# Patient Record
Sex: MALE | Race: WHITE | NOT HISPANIC OR LATINO | Employment: FULL TIME | URBAN - METROPOLITAN AREA
[De-identification: names, ages, dates, MRNs, and addresses within clinical notes are randomized per-mention and may not be internally consistent; named-entity substitution may affect disease eponyms.]

---

## 2020-03-11 ENCOUNTER — OFFICE VISIT (OUTPATIENT)
Dept: URGENT CARE | Facility: CLINIC | Age: 35
End: 2020-03-11
Payer: COMMERCIAL

## 2020-03-11 ENCOUNTER — APPOINTMENT (OUTPATIENT)
Dept: RADIOLOGY | Facility: CLINIC | Age: 35
End: 2020-03-11
Payer: COMMERCIAL

## 2020-03-11 VITALS
SYSTOLIC BLOOD PRESSURE: 150 MMHG | WEIGHT: 213 LBS | HEIGHT: 70 IN | HEART RATE: 70 BPM | DIASTOLIC BLOOD PRESSURE: 90 MMHG | BODY MASS INDEX: 30.49 KG/M2 | TEMPERATURE: 98.7 F | RESPIRATION RATE: 18 BRPM | OXYGEN SATURATION: 97 %

## 2020-03-11 DIAGNOSIS — M25.511 ACUTE PAIN OF RIGHT SHOULDER: Primary | ICD-10-CM

## 2020-03-11 DIAGNOSIS — S49.91XA INJURY OF RIGHT SHOULDER, INITIAL ENCOUNTER: ICD-10-CM

## 2020-03-11 PROCEDURE — G0382 LEV 3 HOSP TYPE B ED VISIT: HCPCS | Performed by: PHYSICIAN ASSISTANT

## 2020-03-11 PROCEDURE — 73030 X-RAY EXAM OF SHOULDER: CPT

## 2020-03-11 RX ORDER — CYCLOBENZAPRINE HCL 10 MG
10 TABLET ORAL
Qty: 21 TABLET | Refills: 0 | Status: SHIPPED | OUTPATIENT
Start: 2020-03-11

## 2020-03-11 RX ORDER — METHYLPREDNISOLONE 4 MG/1
TABLET ORAL
Qty: 1 EACH | Refills: 0 | Status: SHIPPED | OUTPATIENT
Start: 2020-03-11

## 2020-03-11 NOTE — PATIENT INSTRUCTIONS
Will follow-up with final radiology report when available, within 24 hours  Ice 20 minutes every 3-4 hours, may alternate with moist heat  Steroids as directed, make sure you eat when taking the steroid  Muscle relaxer would recommend at that time, do not drive while taking

## 2020-03-11 NOTE — PROGRESS NOTES
[id number]        NAME: Kyra Enriquez is a 38 y o  male  : 1985    MRN: [id number]  DATE: 2020  TIME: 2:12 PM    Assessment and Plan   Acute pain of right shoulder [M25 511]  1  Acute pain of right shoulder  XR shoulder 2+ vw right    cyclobenzaprine (FLEXERIL) 10 mg tablet    methylPREDNISolone 4 MG tablet therapy pack     Right shoulder xray reviewed by myself there are calcifications consistent with a calcific tendinitis     Patient Instructions     Follow up with PCP in 3-5 days  Proceed to  ER if symptoms worsen  Chief Complaint     Chief Complaint   Patient presents with    Shoulder Pain     c/o of right shoulder pain for three days  History of Present Illness       69-year-old male presents for evaluation of right shoulder pain  Symptoms ongoing for about 3 days  Patient states he believes he slept on it wrong  Patient complains of pain radiating from the right side of the neck down to the right shoulder  He denies previous injury  Denies paresthesias  Review of Systems   Review of Systems   Constitutional: Negative for chills, fatigue and fever  HENT: Negative for congestion, ear pain, sinus pain, sore throat and trouble swallowing  Eyes: Negative for pain, discharge and redness  Respiratory: Negative for cough, chest tightness, shortness of breath and wheezing  Cardiovascular: Negative for chest pain, palpitations and leg swelling  Gastrointestinal: Negative for abdominal pain, diarrhea, nausea and vomiting  Musculoskeletal: Positive for arthralgias  Negative for joint swelling and myalgias  Skin: Negative for rash  Neurological: Negative for dizziness, weakness, numbness and headaches           Current Medications       Current Outpatient Medications:     cyclobenzaprine (FLEXERIL) 10 mg tablet, Take 1 tablet (10 mg total) by mouth daily at bedtime as needed for muscle spasms, Disp: 21 tablet, Rfl: 0    methylPREDNISolone 4 MG tablet therapy pack, Use as directed on package, Disp: 1 each, Rfl: 0    Current Allergies     Allergies as of 03/11/2020    (No Known Allergies)            The following portions of the patient's history were reviewed and updated as appropriate: allergies, current medications, past family history, past medical history, past social history, past surgical history and problem list      History reviewed  No pertinent past medical history  History reviewed  No pertinent surgical history  Family History   Problem Relation Age of Onset    No Known Problems Mother     No Known Problems Father          Medications have been verified  Objective   /90   Pulse 70   Temp 98 7 °F (37 1 °C) (Temporal)   Resp 18   Ht 5' 10" (1 778 m)   Wt 96 6 kg (213 lb)   SpO2 97%   BMI 30 56 kg/m²        Physical Exam     Physical Exam   Constitutional: Vital signs are normal  He appears well-developed  No distress  Cardiovascular: Normal rate, regular rhythm, normal heart sounds and intact distal pulses  Pulmonary/Chest: Effort normal and breath sounds normal    Musculoskeletal:        Right shoulder: He exhibits tenderness (ttp AC joint)          Left shoulder: Normal    Limited R shoulder flexion to 90 degrees

## 2020-03-12 ENCOUNTER — TELEPHONE (OUTPATIENT)
Dept: URGENT CARE | Facility: CLINIC | Age: 35
End: 2020-03-12

## 2020-03-12 NOTE — TELEPHONE ENCOUNTER
Discussed final radiology report with patient  There is a calcific tendinitis noted on right shoulder xray that was discussed with the patient yesterday  Patient states he has a f/u with orthopedics on Monday  Encourage follow up and ice 20 min every 3-4 hours  Patient has no further questions at this time

## 2021-09-20 ENCOUNTER — OFFICE VISIT (OUTPATIENT)
Dept: URGENT CARE | Facility: CLINIC | Age: 36
End: 2021-09-20
Payer: COMMERCIAL

## 2021-09-20 VITALS
DIASTOLIC BLOOD PRESSURE: 90 MMHG | HEART RATE: 65 BPM | BODY MASS INDEX: 31.14 KG/M2 | RESPIRATION RATE: 16 BRPM | OXYGEN SATURATION: 96 % | TEMPERATURE: 97.7 F | SYSTOLIC BLOOD PRESSURE: 138 MMHG | WEIGHT: 217 LBS

## 2021-09-20 DIAGNOSIS — R21 RASH: Primary | ICD-10-CM

## 2021-09-20 PROCEDURE — G0382 LEV 3 HOSP TYPE B ED VISIT: HCPCS | Performed by: PHYSICIAN ASSISTANT

## 2021-09-20 RX ORDER — LISINOPRIL 20 MG/1
TABLET ORAL
COMMUNITY
Start: 2021-06-16

## 2021-09-20 RX ORDER — LOSARTAN POTASSIUM 50 MG/1
50 TABLET ORAL DAILY
COMMUNITY
Start: 2021-07-01

## 2021-09-20 RX ORDER — CLINDAMYCIN HYDROCHLORIDE 300 MG/1
300 CAPSULE ORAL 3 TIMES DAILY
Qty: 21 CAPSULE | Refills: 0 | Status: SHIPPED | OUTPATIENT
Start: 2021-09-20 | End: 2021-09-27

## 2021-09-20 NOTE — PATIENT INSTRUCTIONS
Folliculitis   WHAT YOU NEED TO KNOW:   Folliculitis is inflammation of your hair follicles  A hair follicle is a sac under your skin  Your hair grows out of the follicle  Folliculitis is caused by bacteria or fungus, most commonly a germ called Staph  Folliculitis can occur anywhere you have hair  DISCHARGE INSTRUCTIONS:   Medicines:   · Antibiotics: This medicine is given to fight or prevent an infection caused by bacteria  It may be given as an ointment that you apply to your skin or as a pill  Always take your antibiotics exactly as ordered by your healthcare provider  Never save antibiotics or take leftover antibiotics that were given to you for another illness  · Antifungal medicine: This medicine helps kill fungus that may be causing your folliculitis  It may be given as an cream that you apply to your skin or as a pill  · NSAIDs , such as ibuprofen, help decrease swelling, pain, and fever  This medicine is available with or without a doctor's order  NSAIDs can cause stomach bleeding or kidney problems in certain people  If you take blood thinner medicine, always ask if NSAIDs are safe for you  Always read the medicine label and follow directions  Do not give these medicines to children under 10months of age without direction from your child's healthcare provider  · Antihistamines: This medicine may be given to help decrease itching  · Take your medicine as directed  Contact your healthcare provider if you think your medicine is not helping or if you have side effects  Tell him of her if you are allergic to any medicine  Keep a list of the medicines, vitamins, and herbs you take  Include the amounts, and when and why you take them  Bring the list or the pill bottles to follow-up visits  Carry your medicine list with you in case of an emergency      Follow up with your healthcare provider or dermatologist as directed:  Write down your questions so you remember to ask them during your visits  Manage folliculitis:   · Use warm compresses:  Wet a washcloth with warm water and apply it to the infected skin area to help decrease pain and swelling  Warm compresses may also help drain pus and improve healing  · Clean the area:  Use antibacterial soap to wash the affected area  Change your washcloths and towels every day  · Avoid shaving the area: If possible, do not shave areas that have folliculitis  If you must shave, use an electric razor or new blade every time you shave  Prevent folliculitis:   · Do not share personal items:  Do not share towels, soap, or any personal items with other people  · Do not wear tight clothing:  Do not wear tight-fitting clothes that rub against and irritate your skin  · Treat skin injuries right away:  Treat injuries such as cuts and scrapes right away  Wash them with warm, soapy water, and cover the area to prevent infection  Contact your healthcare provider or dermatologist if:  · You have a fever  · You have foul-smelling pus coming from the bumps on your skin  · Your rash is spreading  · You have questions or concerns about your condition or care  Return to the emergency department if:  · You develop large areas of red, warm, tender skin around the folliculitis  · You develop boils  © Copyright [employer] 2021 Information is for End User's use only and may not be sold, redistributed or otherwise used for commercial purposes  All illustrations and images included in CareNotes® are the copyrighted property of [employer]  or Traci Rowell   The above information is an  only  It is not intended as medical advice for individual conditions or treatments  Talk to your doctor, nurse or pharmacist before following any medical regimen to see if it is safe and effective for you

## 2021-09-20 NOTE — PROGRESS NOTES
[email or web address]        NAME: Aimee South is a 43 y o  male  : 1985    MRN: [id number]  DATE: 2021  TIME: 12:05 PM    Assessment and Plan   Rash [R21]  1  Rash  clindamycin (CLEOCIN) 300 MG capsule         Patient Instructions   Patient Instructions   Folliculitis   WHAT YOU NEED TO KNOW:   Folliculitis is inflammation of your hair follicles  A hair follicle is a sac under your skin  Your hair grows out of the follicle  Folliculitis is caused by bacteria or fungus, most commonly a germ called Staph  Folliculitis can occur anywhere you have hair  DISCHARGE INSTRUCTIONS:   Medicines:   · Antibiotics: This medicine is given to fight or prevent an infection caused by bacteria  It may be given as an ointment that you apply to your skin or as a pill  Always take your antibiotics exactly as ordered by your healthcare provider  Never save antibiotics or take leftover antibiotics that were given to you for another illness  · Antifungal medicine: This medicine helps kill fungus that may be causing your folliculitis  It may be given as an cream that you apply to your skin or as a pill  · NSAIDs , such as ibuprofen, help decrease swelling, pain, and fever  This medicine is available with or without a doctor's order  NSAIDs can cause stomach bleeding or kidney problems in certain people  If you take blood thinner medicine, always ask if NSAIDs are safe for you  Always read the medicine label and follow directions  Do not give these medicines to children under 10months of age without direction from your child's healthcare provider  · Antihistamines: This medicine may be given to help decrease itching  · Take your medicine as directed  Contact your healthcare provider if you think your medicine is not helping or if you have side effects  Tell him of her if you are allergic to any medicine  Keep a list of the medicines, vitamins, and herbs you take   Include the amounts, and when and why you take them  Bring the list or the pill bottles to follow-up visits  Carry your medicine list with you in case of an emergency  Follow up with your healthcare provider or dermatologist as directed:  Write down your questions so you remember to ask them during your visits  Manage folliculitis:   · Use warm compresses:  Wet a washcloth with warm water and apply it to the infected skin area to help decrease pain and swelling  Warm compresses may also help drain pus and improve healing  · Clean the area:  Use antibacterial soap to wash the affected area  Change your washcloths and towels every day  · Avoid shaving the area: If possible, do not shave areas that have folliculitis  If you must shave, use an electric razor or new blade every time you shave  Prevent folliculitis:   · Do not share personal items:  Do not share towels, soap, or any personal items with other people  · Do not wear tight clothing:  Do not wear tight-fitting clothes that rub against and irritate your skin  · Treat skin injuries right away:  Treat injuries such as cuts and scrapes right away  Wash them with warm, soapy water, and cover the area to prevent infection  Contact your healthcare provider or dermatologist if:  · You have a fever  · You have foul-smelling pus coming from the bumps on your skin  · Your rash is spreading  · You have questions or concerns about your condition or care  Return to the emergency department if:  · You develop large areas of red, warm, tender skin around the folliculitis  · You develop boils  © Copyright [address] 2021 Information is for End User's use only and may not be sold, redistributed or otherwise used for commercial purposes  All illustrations and images included in CareNotes® are the copyrighted property of [employer]  or Traci Altman  The above information is an  only   It is not intended as medical advice for individual conditions or treatments  Talk to your doctor, nurse or pharmacist before following any medical regimen to see if it is safe and effective for you  Follow up with PCP in 3-5 days  Proceed to  ER if symptoms worsen  Chief Complaint     Chief Complaint   Patient presents with    Rash     x 2 days, on back, c/o tingling and burning  History of Present Illness       The pt is a 49-year-old male presenting for a rash on his back  He reports tingling and burning  Worried for shingles  Review of Systems   Review of Systems   Constitutional: Negative for activity change, appetite change, chills, diaphoresis and fever  HENT: Negative for congestion, rhinorrhea and sore throat  Respiratory: Negative for cough, chest tightness and shortness of breath  Cardiovascular: Negative for chest pain and palpitations  Gastrointestinal: Negative for abdominal pain, diarrhea, nausea and vomiting  Musculoskeletal: Negative for arthralgias and myalgias  Skin: Positive for rash  Negative for color change and pallor  Neurological: Negative for headaches           Current Medications       Current Outpatient Medications:     losartan (COZAAR) 50 mg tablet, Take 50 mg by mouth daily, Disp: , Rfl:     clindamycin (CLEOCIN) 300 MG capsule, Take 1 capsule (300 mg total) by mouth 3 (three) times a day for 7 days, Disp: 21 capsule, Rfl: 0    cyclobenzaprine (FLEXERIL) 10 mg tablet, Take 1 tablet (10 mg total) by mouth daily at bedtime as needed for muscle spasms (Patient not taking: Reported on 9/20/2021), Disp: 21 tablet, Rfl: 0    lisinopril (ZESTRIL) 20 mg tablet, , Disp: , Rfl:     methylPREDNISolone 4 MG tablet therapy pack, Use as directed on package (Patient not taking: Reported on 9/20/2021), Disp: 1 each, Rfl: 0    Current Allergies     Allergies as of 09/20/2021    (No Known Allergies)            The following portions of the patient's history were reviewed and updated as appropriate: allergies, current medications, past family history, past medical history, past social history, past surgical history and problem list      Past Medical History:   Diagnosis Date    Hypertension        History reviewed  No pertinent surgical history  Family History   Problem Relation Age of Onset    No Known Problems Mother     No Known Problems Father          Medications have been verified  Objective   /90   Pulse 65   Temp 97 7 °F (36 5 °C) (Temporal)   Resp 16   Wt 98 4 kg (217 lb)   SpO2 96%   BMI 31 14 kg/m²        Physical Exam     Physical Exam  Vitals reviewed  Constitutional:       General: He is not in acute distress  Appearance: Normal appearance  He is normal weight  He is not ill-appearing, toxic-appearing or diaphoretic  HENT:      Head: Normocephalic and atraumatic  Skin:     General: Skin is warm and dry  Capillary Refill: Capillary refill takes less than 2 seconds  Findings: Rash present  Comments: Folliculitis over the back diffusely  Small pustules in the hair follicles       Neurological:      Mental Status: He is alert

## 2021-11-04 ENCOUNTER — HOSPITAL ENCOUNTER (EMERGENCY)
Facility: HOSPITAL | Age: 36
Discharge: HOME | End: 2021-11-04
Attending: EMERGENCY MEDICINE
Payer: COMMERCIAL

## 2021-11-04 VITALS
OXYGEN SATURATION: 98 % | HEART RATE: 60 BPM | TEMPERATURE: 97.7 F | BODY MASS INDEX: 31.14 KG/M2 | SYSTOLIC BLOOD PRESSURE: 170 MMHG | WEIGHT: 217 LBS | DIASTOLIC BLOOD PRESSURE: 108 MMHG | RESPIRATION RATE: 16 BRPM

## 2021-11-04 DIAGNOSIS — R03.0 ELEVATED BLOOD PRESSURE READING: Primary | ICD-10-CM

## 2021-11-04 DIAGNOSIS — R51.9 ACUTE NONINTRACTABLE HEADACHE, UNSPECIFIED HEADACHE TYPE: ICD-10-CM

## 2021-11-04 LAB
ALBUMIN SERPL BCP-MCNC: 4 G/DL (ref 3.5–5)
ALP SERPL-CCNC: 49 U/L (ref 46–116)
ALT SERPL W P-5'-P-CCNC: 36 U/L (ref 12–78)
ANION GAP SERPL CALCULATED.3IONS-SCNC: 7 MMOL/L (ref 4–13)
AST SERPL W P-5'-P-CCNC: 18 U/L (ref 5–45)
BASOPHILS # BLD AUTO: 0.09 THOUSANDS/ΜL (ref 0–0.1)
BASOPHILS NFR BLD AUTO: 1 % (ref 0–1)
BILIRUB SERPL-MCNC: 0.36 MG/DL (ref 0.2–1)
BUN SERPL-MCNC: 18 MG/DL (ref 5–25)
CALCIUM SERPL-MCNC: 8.8 MG/DL (ref 8.3–10.1)
CHLORIDE SERPL-SCNC: 105 MMOL/L (ref 100–108)
CO2 SERPL-SCNC: 30 MMOL/L (ref 21–32)
CREAT SERPL-MCNC: 0.95 MG/DL (ref 0.6–1.3)
EOSINOPHIL # BLD AUTO: 0.23 THOUSAND/ΜL (ref 0–0.61)
EOSINOPHIL NFR BLD AUTO: 2 % (ref 0–6)
ERYTHROCYTE [DISTWIDTH] IN BLOOD BY AUTOMATED COUNT: 12.2 % (ref 11.6–15.1)
GFR SERPL CREATININE-BSD FRML MDRD: 101 ML/MIN/1.73SQ M
GLUCOSE SERPL-MCNC: 110 MG/DL (ref 65–140)
HCT VFR BLD AUTO: 46.4 % (ref 36.5–49.3)
HGB BLD-MCNC: 15.6 G/DL (ref 12–17)
IMM GRANULOCYTES # BLD AUTO: 0.07 THOUSAND/UL (ref 0–0.2)
IMM GRANULOCYTES NFR BLD AUTO: 1 % (ref 0–2)
LYMPHOCYTES # BLD AUTO: 1.75 THOUSANDS/ΜL (ref 0.6–4.47)
LYMPHOCYTES NFR BLD AUTO: 13 % (ref 14–44)
MCH RBC QN AUTO: 29 PG (ref 26.8–34.3)
MCHC RBC AUTO-ENTMCNC: 33.6 G/DL (ref 31.4–37.4)
MCV RBC AUTO: 86 FL (ref 82–98)
MONOCYTES # BLD AUTO: 0.76 THOUSAND/ΜL (ref 0.17–1.22)
MONOCYTES NFR BLD AUTO: 6 % (ref 4–12)
NEUTROPHILS # BLD AUTO: 10.67 THOUSANDS/ΜL (ref 1.85–7.62)
NEUTS SEG NFR BLD AUTO: 77 % (ref 43–75)
NRBC BLD AUTO-RTO: 0 /100 WBCS
PLATELET # BLD AUTO: 290 THOUSANDS/UL (ref 149–390)
PMV BLD AUTO: 9.9 FL (ref 8.9–12.7)
POTASSIUM SERPL-SCNC: 3.9 MMOL/L (ref 3.5–5.3)
PROT SERPL-MCNC: 7.7 G/DL (ref 6.4–8.2)
RBC # BLD AUTO: 5.38 MILLION/UL (ref 3.88–5.62)
SODIUM SERPL-SCNC: 142 MMOL/L (ref 136–145)
TSH SERPL DL<=0.05 MIU/L-ACNC: 0.38 UIU/ML (ref 0.36–3.74)
WBC # BLD AUTO: 13.57 THOUSAND/UL (ref 4.31–10.16)

## 2021-11-04 PROCEDURE — 36415 COLL VENOUS BLD VENIPUNCTURE: CPT | Performed by: PHYSICIAN ASSISTANT

## 2021-11-04 PROCEDURE — 93005 ELECTROCARDIOGRAM TRACING: CPT

## 2021-11-04 PROCEDURE — 96375 TX/PRO/DX INJ NEW DRUG ADDON: CPT

## 2021-11-04 PROCEDURE — 99285 EMERGENCY DEPT VISIT HI MDM: CPT | Performed by: PHYSICIAN ASSISTANT

## 2021-11-04 PROCEDURE — 84443 ASSAY THYROID STIM HORMONE: CPT | Performed by: PHYSICIAN ASSISTANT

## 2021-11-04 PROCEDURE — 99284 EMERGENCY DEPT VISIT MOD MDM: CPT

## 2021-11-04 PROCEDURE — 96374 THER/PROPH/DIAG INJ IV PUSH: CPT

## 2021-11-04 PROCEDURE — 85025 COMPLETE CBC W/AUTO DIFF WBC: CPT | Performed by: PHYSICIAN ASSISTANT

## 2021-11-04 PROCEDURE — 80053 COMPREHEN METABOLIC PANEL: CPT | Performed by: PHYSICIAN ASSISTANT

## 2021-11-04 RX ORDER — KETOROLAC TROMETHAMINE 30 MG/ML
15 INJECTION, SOLUTION INTRAMUSCULAR; INTRAVENOUS ONCE
Status: COMPLETED | OUTPATIENT
Start: 2021-11-04 | End: 2021-11-04

## 2021-11-04 RX ORDER — DIPHENHYDRAMINE HYDROCHLORIDE 50 MG/ML
25 INJECTION INTRAMUSCULAR; INTRAVENOUS ONCE
Status: COMPLETED | OUTPATIENT
Start: 2021-11-04 | End: 2021-11-04

## 2021-11-04 RX ORDER — METOCLOPRAMIDE HYDROCHLORIDE 5 MG/ML
10 INJECTION INTRAMUSCULAR; INTRAVENOUS ONCE
Status: COMPLETED | OUTPATIENT
Start: 2021-11-04 | End: 2021-11-04

## 2021-11-04 RX ADMIN — DIPHENHYDRAMINE HYDROCHLORIDE 25 MG: 50 INJECTION, SOLUTION INTRAMUSCULAR; INTRAVENOUS at 21:36

## 2021-11-04 RX ADMIN — METOCLOPRAMIDE 10 MG: 5 INJECTION, SOLUTION INTRAMUSCULAR; INTRAVENOUS at 21:36

## 2021-11-04 RX ADMIN — KETOROLAC TROMETHAMINE 15 MG: 30 INJECTION, SOLUTION INTRAMUSCULAR at 21:36

## 2021-11-05 LAB
ATRIAL RATE: 57 BPM
P AXIS: 53 DEGREES
PR INTERVAL: 196 MS
QRS AXIS: 54 DEGREES
QRSD INTERVAL: 90 MS
QT INTERVAL: 402 MS
QTC INTERVAL: 391 MS
T WAVE AXIS: 44 DEGREES
VENTRICULAR RATE: 57 BPM

## 2021-11-05 PROCEDURE — 93010 ELECTROCARDIOGRAM REPORT: CPT | Performed by: INTERNAL MEDICINE

## 2023-03-05 ENCOUNTER — APPOINTMENT (EMERGENCY)
Dept: RADIOLOGY | Facility: HOSPITAL | Age: 38
End: 2023-03-05

## 2023-03-05 ENCOUNTER — HOSPITAL ENCOUNTER (EMERGENCY)
Facility: HOSPITAL | Age: 38
Discharge: HOME | End: 2023-03-05
Attending: EMERGENCY MEDICINE | Admitting: EMERGENCY MEDICINE

## 2023-03-05 VITALS
RESPIRATION RATE: 18 BRPM | TEMPERATURE: 98 F | DIASTOLIC BLOOD PRESSURE: 57 MMHG | HEART RATE: 71 BPM | SYSTOLIC BLOOD PRESSURE: 114 MMHG | OXYGEN SATURATION: 98 %

## 2023-03-05 DIAGNOSIS — S69.91XA RIGHT WRIST INJURY, INITIAL ENCOUNTER: Primary | ICD-10-CM

## 2023-03-05 RX ORDER — OXYCODONE HYDROCHLORIDE 5 MG/1
5 TABLET ORAL EVERY 4 HOURS PRN
Qty: 8 TABLET | Refills: 0 | Status: SHIPPED | OUTPATIENT
Start: 2023-03-05 | End: 2023-03-15

## 2023-03-05 RX ORDER — IBUPROFEN 600 MG/1
600 TABLET ORAL ONCE
Status: COMPLETED | OUTPATIENT
Start: 2023-03-05 | End: 2023-03-05

## 2023-03-05 RX ORDER — NAPROXEN 500 MG/1
500 TABLET ORAL EVERY 12 HOURS PRN
Qty: 20 TABLET | Refills: 0 | Status: SHIPPED | OUTPATIENT
Start: 2023-03-05

## 2023-03-05 RX ORDER — ACETAMINOPHEN 325 MG/1
650 TABLET ORAL ONCE
Status: COMPLETED | OUTPATIENT
Start: 2023-03-05 | End: 2023-03-05

## 2023-03-05 RX ADMIN — ACETAMINOPHEN 650 MG: 325 TABLET, FILM COATED ORAL at 19:16

## 2023-03-05 RX ADMIN — IBUPROFEN 600 MG: 600 TABLET, FILM COATED ORAL at 19:16

## 2023-03-05 NOTE — Clinical Note
Sallie Menon was seen and treated in our emergency department on 3/5/2023  No work until cleared by Family Doctor/Orthopedics        Diagnosis: Right wrist injury    Shimon    He may return on this date: If you have any questions or concerns, please don't hesitate to call        Phil Officer, DO    ______________________________           _______________          _______________  Hospital Representative                              Date                                Time

## 2023-03-06 VITALS
WEIGHT: 203.4 LBS | SYSTOLIC BLOOD PRESSURE: 128 MMHG | RESPIRATION RATE: 18 BRPM | DIASTOLIC BLOOD PRESSURE: 80 MMHG | BODY MASS INDEX: 29.12 KG/M2 | HEIGHT: 70 IN | HEART RATE: 79 BPM | OXYGEN SATURATION: 98 %

## 2023-03-06 DIAGNOSIS — S69.91XA RIGHT WRIST INJURY, INITIAL ENCOUNTER: Primary | ICD-10-CM

## 2023-03-06 NOTE — RESULT ENCOUNTER NOTE
Justine with patient has an appointment with orthopedics in about 45 minutes to go over x-ray    States that his wrist feels better but is aware that it was recommend that he has a CAT scan and is following up today

## 2023-03-06 NOTE — PROGRESS NOTES
Assessment/Plan:    No problem-specific Assessment & Plan notes found for this encounter  Diagnoses and all orders for this visit:    Right wrist injury, initial encounter  -     Ambulatory Referral to Orthopedic Surgery  -     Cock Up Wrist Splint          Prior radiographs emergency room reviewed independently with patient  There are ossific densities noted on the radiograph however no acute fractures were appreciated  Given patient's resolution of pain symptoms and no reproduction of pain with palpation and pain-free range of motion, I advised patient that we can hold on advanced imaging at this time  Patient was taken out of the thumb spica splint and transitioned into a wrist brace for activity use  I am recommending that patient can follow-up as needed if pain symptoms recur  Patient expressed understanding and will follow-up if needed    Subjective:      Patient ID: Shan Colindres is a 34 y o  male presenting for initial evaluation in regards to right wrist injury  Patient states that approximately 1 week ago he was climbing down a ladder and struck his right wrist against the ladder  States that he did not feel pain initially however in the coming days he started developing increased pain over the volar aspect of the right wrist radial sided  He was seen in the emergency room and placed into a thumb spica splint  He states that he has been compliant with the splint since then  Reports complete resolution of pain symptoms since yesterday  Does report occasional discoloration in the fingertips in cold weather  Radiographs of the right wrist were performed and ossific densities were noted at the volar aspect of the wrist which are considered possible fracture fragments      HPI    The following portions of the patient's history were reviewed and updated as appropriate: allergies, current medications, past family history, past medical history, past social history, past surgical history and problem list     Review of Systems      Objective:      /80   Pulse 79   Resp 18   Ht 5' 10" (1 778 m)   Wt 92 3 kg (203 lb 6 4 oz)   SpO2 98%   BMI 29 18 kg/m²          Physical Exam      Right wrist and hand exam  No ecchymosis or gross deformity noted  No skin changes  No swelling  Patient exhibits full range of motion with wrist flexion extension and forearm pronation supination with 5 out of 5 strength  He does not have any tenderness to palpation over the anatomic snuffbox  No tenderness to palpation over the distal radius or ulna  No sensory deficits to light touch  Normal cap refill

## 2023-03-06 NOTE — ED PROVIDER NOTES
History  Chief Complaint   Patient presents with   • Wrist Injury     Patient reports hitting right wrist on ladder x 1 week ago  Patient is a 57-year-old male with past medical history of hypertension, presents to the emergency department complaining of right wrist pain after striking his wrist against a ladder that he was climbing down at work 4 to 5 days ago  Patient states initially he did not feel pain but over the past 2 to 3 days he has had increasing pain to the radial aspect of the right wrist   He reports touching the area and certain wrist movements exacerbate the pain  He has been taking over-the-counter pain medication without much relief  He denies any radiation into the forearm or elbow joint  He denies any discoloration of the hand, paresthesia or weakness  Denies any other injuries or complaints at this time and rest of review of systems is negative  Patient is right-hand dominant  Denies being on any blood thinners  History provided by:  Patient   used: No        Prior to Admission Medications   Prescriptions Last Dose Informant Patient Reported? Taking? cyclobenzaprine (FLEXERIL) 10 mg tablet   No No   Sig: Take 1 tablet (10 mg total) by mouth daily at bedtime as needed for muscle spasms   Patient not taking: Reported on 9/20/2021   lisinopril (ZESTRIL) 20 mg tablet   Yes No   Patient not taking: Reported on 9/20/2021   losartan (COZAAR) 50 mg tablet   Yes No   Sig: Take 50 mg by mouth daily   methylPREDNISolone 4 MG tablet therapy pack   No No   Sig: Use as directed on package   Patient not taking: Reported on 9/20/2021      Facility-Administered Medications: None       Past Medical History:   Diagnosis Date   • Hypertension        No past surgical history on file  Family History   Problem Relation Age of Onset   • No Known Problems Mother    • No Known Problems Father      I have reviewed and agree with the history as documented      E-Cigarette/Vaping • E-Cigarette Use Never User      E-Cigarette/Vaping Substances     Social History     Tobacco Use   • Smoking status: Never   • Smokeless tobacco: Never   Vaping Use   • Vaping Use: Never used   Substance Use Topics   • Alcohol use: Yes   • Drug use: Never       Review of Systems   Constitutional: Negative for chills and fever  HENT: Negative for congestion, ear pain, rhinorrhea and sore throat  Respiratory: Negative for cough and shortness of breath  Cardiovascular: Negative for chest pain and palpitations  Gastrointestinal: Negative for abdominal pain, constipation, diarrhea, nausea and vomiting  Genitourinary: Negative for dysuria, flank pain, frequency and hematuria  Musculoskeletal: Positive for arthralgias  Negative for back pain, joint swelling, neck pain and neck stiffness  +Right wrist pain s/p injury  Skin: Negative for color change, pallor, rash and wound  Allergic/Immunologic: Negative for immunocompromised state  Neurological: Negative for dizziness, syncope, weakness, light-headedness, numbness and headaches  Hematological: Negative for adenopathy  Does not bruise/bleed easily  Psychiatric/Behavioral: Negative for confusion and decreased concentration  All other systems reviewed and are negative  Physical Exam  Physical Exam  Vitals and nursing note reviewed  Constitutional:       General: He is not in acute distress  Appearance: Normal appearance  He is well-developed  He is not ill-appearing, toxic-appearing or diaphoretic  HENT:      Head: Normocephalic and atraumatic  Right Ear: External ear normal       Left Ear: External ear normal       Nose: Nose normal       Mouth/Throat:      Comments: Orpharyngeal exam deferred at this time due to risk of exposure to respiratory illness/viruses during peak season  Patient has no oropharyngeal complaints  Eyes:      Extraocular Movements: Extraocular movements intact        Conjunctiva/sclera: Conjunctivae normal    Neck:      Vascular: No JVD  Cardiovascular:      Rate and Rhythm: Normal rate and regular rhythm  Pulses: Normal pulses  Heart sounds: Normal heart sounds  No murmur heard  No friction rub  No gallop  Pulmonary:      Effort: Pulmonary effort is normal  No respiratory distress  Breath sounds: Normal breath sounds  No wheezing, rhonchi or rales  Abdominal:      General: There is no distension  Palpations: Abdomen is soft  Tenderness: There is no abdominal tenderness  There is no guarding or rebound  Musculoskeletal:         General: Tenderness present  No swelling or deformity  Cervical back: Normal range of motion and neck supple  No rigidity  Comments: RIGHT UPPER EXTREMITY: Tenderness over the radial aspect of the wrist and over the scaphoid bone/snuffbox region  +Pain with axial loading of the thumb  FROM all digits  Intact hand- strength  No gross motor/sensory deficits  2+ ulnar and radial pulses  Skin:     General: Skin is warm and dry  Coloration: Skin is not pale  Findings: No erythema or rash  Neurological:      General: No focal deficit present  Mental Status: He is alert and oriented to person, place, and time  Sensory: No sensory deficit  Motor: No weakness     Psychiatric:         Mood and Affect: Mood normal          Behavior: Behavior normal          Vital Signs  ED Triage Vitals [03/05/23 1830]   Temperature Pulse Respirations Blood Pressure SpO2   98 °F (36 7 °C) 71 18 114/57 98 %      Temp src Heart Rate Source Patient Position - Orthostatic VS BP Location FiO2 (%)   -- -- -- -- --      Pain Score       --         Vitals:    03/05/23 1830   BP: 114/57   Pulse: 71   Resp: 18   Temp: 98 °F (36 7 °C)   SpO2: 98%       Visual Acuity      ED Medications  Medications   ibuprofen (MOTRIN) tablet 600 mg (600 mg Oral Given 3/5/23 1916)   acetaminophen (TYLENOL) tablet 650 mg (650 mg Oral Given 3/5/23 1916) Diagnostic Studies  Results Reviewed     None                 XR wrist 3+ views RIGHT   ED Interpretation by Lindsey Parson DO (03/05 1955)   Questionable avulsion fracture off of the trapezius or scaphoid bone  Procedures  Splint application    Date/Time: 3/5/2023 7:59 PM  Performed by: Lindsey Parson DO  Authorized by: Lindsey Parson DO   Universal Protocol:  Procedure performed by: (ED tech performed splint application under ED physician supervision)  Consent: Verbal consent obtained  Risks and benefits: risks, benefits and alternatives were discussed  Consent given by: patient  Patient understanding: patient states understanding of the procedure being performed  Radiology Images displayed and confirmed  If images not available, report reviewed: imaging studies available  Required items: required blood products, implants, devices, and special equipment available      Pre-procedure details:     Sensation:  Normal    Skin color:  Normal  Procedure details:     Laterality:  Right    Location:  Wrist    Wrist:  R wrist    Splint type:  Thumb spica    Supplies:  Cotton padding and Ortho-Glass  Post-procedure details:     Pain:  Unchanged    Sensation:  Normal    Patient tolerance of procedure: Tolerated well, no immediate complications             ED Course  ED Course as of 03/05/23 2001   Nelida Salcedo Mar 05, 2023   2000 Discussed with patient possibility of wrist fracture however radiologist has not officially read the x-ray  Based on his tenderness, patient was placed in a thumb spica splint and advised to keep the splint on until he can follow-up with orthopedic surgery  Discussed how to take care of the splint and not to get it wet  Discussed symptomatic management at home and when to return to the ER                                   Medical Decision Making  26-year-old male presents to the ED for right wrist pain after he injured it when he was climbing down a ladder several days ago and hit his wrist against the ladder  Most likely patient has bone contusion however occult fracture also considered  Will obtain x-ray of the right wrist and given his snuffbox/scaphoid tenderness, will likely place in a thumb spica splint and refer to orthopedic surgery  Will provide ibuprofen and Tylenol for pain relief in the ED  Patient requesting narcotics however he drove to the ED and will be agreeable to sending a short course prescription to his pharmacy but unable to provide any sedating medications in the ED currently since he is driving home  Amount and/or Complexity of Data Reviewed  Radiology: ordered and independent interpretation performed  Decision-making details documented in ED Course  Risk  OTC drugs  Prescription drug management  Disposition  Final diagnoses:   Right wrist injury, initial encounter     Time reflects when diagnosis was documented in both MDM as applicable and the Disposition within this note     Time User Action Codes Description Comment    3/5/2023  7:56 PM Vick Macias [[id number]] Right wrist injury, initial encounter       ED Disposition     ED Disposition   Discharge    Condition   Stable    Date/Time   Sun Mar 5, 2023  7:56 PM    Comment   Daniel Shankar discharge to home/self care                 Follow-up Information     Follow up With Specialties Details Why Contact Info Additional [address] [facility] [place] [facility] Schedule an appointment as soon as possible for a visit   [address] [facility] [address], [place] [address] [address]  [phone] [address] [place] [address] [address], [place] [address], [facility], [address] [facility], [address] [place] [address] Orthopedic Surgery Schedule an appointment as soon as possible for a visit   [address] [facility]  [place] [address], [address] [address] [place], [place], [address] [facility] [address]    [facility] Emergency Medicine Go to  If symptoms worsen [address] Emergency Department, [address] [place] [address], [id number]          Patient's Medications   Discharge Prescriptions    NAPROXEN (NAPROSYN) 500 MG TABLET    Take 1 tablet (500 mg total) by mouth every 12 (twelve) hours as needed for mild pain or moderate pain       Start Date: 3/5/2023  End Date: --       Order Dose: 500 mg       Quantity: 20 tablet    Refills: 0    OXYCODONE (ROXICODONE) 5 IMMEDIATE RELEASE TABLET    Take 1 tablet (5 mg total) by mouth every 4 (four) hours as needed for severe pain for up to 10 days Max Daily Amount: 30 mg       Start Date: 3/5/2023  End Date: 3/15/2023       Order Dose: 5 mg       Quantity: 8 tablet    Refills: 0           PDMP Review     None          ED Provider  Electronically Signed by           Lindsey Parson DO  03/05/23 2001